# Patient Record
(demographics unavailable — no encounter records)

---

## 2025-07-07 NOTE — DISCUSSION/SUMMARY
[de-identified] : Chief complaint: Left calf pain  HPI: Patient is a 49-year-old male who presents the office today for the evaluation of left calf pain which manifested 2 weeks ago.  Patient reports that he was playing baseball and while running he felt a pop/pull in the left posterior calf followed by pain.  Since that time he has had ongoing pain to the left calf accompanied by swelling to the left calf.  Denies any fall or trauma.  No reported knee pain or ankle pain.  ROS: Positive for left calf pain  Physical examination of the left lower leg:  Edema noted to the left lower leg No appreciable ecchymosis Patient has good range of motion to the left knee in flexion and extension without pain Good range of motion to the left ankle in plantarflexion, dorsiflexion, inversion, eversion without associated pain No tenderness to palpation over the diffuse left knee No tenderness to palpation of the left talar dome, medial malleolus, lateral malleolus, deltoid ligament, ATFL, CFL, PTFL, Achilles tendon There is tenderness to palpation over the posterior medial calf Negative Love's test  2 view x-rays of the left tib-fib performed in the office today show no obvious acute displaced fracture, subluxation, or dislocation  Assessment/plan: Strain of the left calf muscle, I discussed that strains on average take 4-6 weeks to heal/resolve, discussed treatment options  1.  Patient can apply an Ace wrap to the left calf on an as-needed basis while active for comfort and support, recommend that this be removed for resting, sleeping, showering/hygiene 2.  Recommend elevation of the left lower extremity at or above pelvis when resting to help with edema management 3.  Ice or heat can be applied to the left calf on an as-needed basis with sensory precautions 4.  Patient can take over-the-counter medication on an as-needed basis for pain/discomfort 5.  Discussed activity modifications with the patient  Patient will be provided with a 6-week follow-up with our sports medicine department for repeat evaluation if he is still experiencing pain/discomfort, patient verbalized understanding of all findings in the office today, agrees to follow-up as directed